# Patient Record
Sex: MALE | Race: BLACK OR AFRICAN AMERICAN
[De-identification: names, ages, dates, MRNs, and addresses within clinical notes are randomized per-mention and may not be internally consistent; named-entity substitution may affect disease eponyms.]

---
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
## 2021-04-07 ENCOUNTER — HOSPITAL ENCOUNTER (EMERGENCY)
Dept: HOSPITAL 75 - ER | Age: 21
LOS: 1 days | Discharge: HOME | End: 2021-04-08
Payer: SELF-PAY

## 2021-04-07 VITALS — HEIGHT: 66.02 IN | BODY MASS INDEX: 24.09 KG/M2 | WEIGHT: 149.91 LBS

## 2021-04-07 DIAGNOSIS — L02.214: Primary | ICD-10-CM

## 2021-04-07 DIAGNOSIS — Z87.891: ICD-10-CM

## 2021-04-07 DIAGNOSIS — Z20.822: ICD-10-CM

## 2021-04-07 DIAGNOSIS — Z79.52: ICD-10-CM

## 2021-04-07 PROCEDURE — 87077 CULTURE AEROBIC IDENTIFY: CPT

## 2021-04-07 PROCEDURE — 87070 CULTURE OTHR SPECIMN AEROBIC: CPT

## 2021-04-07 PROCEDURE — 87205 SMEAR GRAM STAIN: CPT

## 2021-04-07 PROCEDURE — 87635 SARS-COV-2 COVID-19 AMP PRB: CPT

## 2021-04-07 PROCEDURE — 99283 EMERGENCY DEPT VISIT LOW MDM: CPT

## 2021-04-08 VITALS — DIASTOLIC BLOOD PRESSURE: 74 MMHG | SYSTOLIC BLOOD PRESSURE: 122 MMHG

## 2021-04-08 NOTE — ED INTEGUMENTARY GENERAL
General


Chief Complaint:  Skin/Wound Problems


Stated Complaint:  CYST IN GROIN AREA


Nursing Triage Note:  


AMBULATES TO ROOM #5 ACCOMPANIED BY S/O WOULD C/O ABSCESS TO R GROIN X2-3 DAYS. 


REPORTS TO HAVE TAKEN 10MG FLEXARIL PRIOR TO ARRIVAL.


Source:  patient


Exam Limitations:  no limitations


 (HARRIS,HALLIE,MED STUDENT)





History of Present Illness


Date Seen by Provider:  Apr 8, 2021


Time Seen by Provider:  12:26


Initial Comments


Pt is a 19yo male with no PMH who presents to the ED complaining of a cyst in 

his R groin. States it has been there for 2-3 days and is causing a 

throbbing/stretching pain that makes it difficult to walk. He states this is the

5th or 6th time it has happened in the past 1.5 years. He reports being seen in 

[place] for the same issue in July of 2020 but would not let them drain it 

there. They gave him steroids and antibiotics and it resolved until 2-3 days 

ago. He states it has opened up on its own in the past and he was able to 

express yellow foul smelling drainage from it. It has not opened up yet this 

time. He also reports a dry cough for about 2 days, and feels feverish off and 

on but has not taken his temperature at home. Also notes some nausea, diarrhea 

and pressure in the area of the cyst with urination.


Timing/Duration:  week, getting worse


Severity:  mild


Location:  extremities (right groin)


Associated Symptoms:  fever (HARRIS,AHLLIE,MED STUDENT)





Allergies and Home Medications


Allergies


Coded Allergies:  


     lemon (Verified  Allergy, Unknown, 4/8/21)





Home Medications


Amoxicillin 500 Mg Capsule, 500 MG PO TID


   Prescribed by: ALEAH GORDON on 1/13/17 1353


Prednisone 5 Mg Tablet, 40 MG PO DAILY


   Prescribed by: ALEAH GORDON on 1/13/17 1353


Sulfamethoxazole/Trimethoprim 1 Each Tablet, 1 EACH PO BID


   Prescribed by: JOVANNA INGRAM on 4/8/21 0224





Patient Home Medication List


Home Medication List Reviewed:  Yes


 (JOVANNA YOUNG MD)





Review of Systems


Review of Systems


Constitutional:  chills, fever


EENTM:  no symptoms reported


Respiratory:  cough ("dry cough"); No short of breath


Cardiovascular:  No chest pain, No edema


Gastrointestinal:  No abdominal pain; diarrhea


Genitourinary:  No dysuria, No hematuria; other ("pressure with urination")


Musculoskeletal:  muscle pain (R groin area)


Skin:  lumps (R groin)


Psychiatric/Neurological:  No Symptoms Reported


Endocrine:  No Symptoms Reported


Hematologic/Lymphatic:  No Symptoms Reported (HARRIS,HALLIE,MED STUDENT)





Past Medical-Social-Family Hx


Patient Social History


Alcohol Use:  Denies Use


Smoking Status:  Former Smoker


Type Used:  Cigarettes


2nd Hand Smoke Exposure:  No


Recent Infectious Disease Expo:  No


Recent Hopitalizations:  No


 (HARRIS,HALLIE,MED STUDENT)





Seasonal Allergies


Seasonal Allergies:  No


 (HARRIS,HALLIE,MED STUDENT)





Past Medical History


Surgeries:  No


Respiratory:  No


Cardiac:  No


Neurological:  No


Reproductive Disorders:  No


Gastrointestinal:  No


Musculoskeletal:  No


Endocrine:  No


Cancer:  No


Psychosocial:  No


Integumentary:  No


Blood Disorders:  No


 (HARRIS,HALLIE,MED STUDENT)





Physical Exam


Vital Signs





Vital Signs - First Documented








 4/8/21 4/8/21





 00:08 02:44


 


Temp 36.6 


 


Pulse 92 


 


Resp 18 


 


B/P (MAP) 133/78 (96) 


 


Pulse Ox 97 


 


O2 Delivery  Room Air





 (JOVANNA YOUNG MD)


Vital Signs


Capillary Refill : Less Than 3 Seconds 


 (HARRIS,HALLIE,MED STUDENT)


General Appearance:  WD/WN, no apparent distress


HEENT:  PERRL/EOMI


Neck:  full range of motion, supple


Cardiovascular:  regular rate, rhythm, no murmur


Respiratory:  lungs clear, no respiratory distress, no accessory muscle use


Gastrointestinal:  normal bowel sounds, non tender, soft


Extremities:  normal range of motion, no pedal edema, no calf tenderness, 

swelling (R groin with firm, swollen area)


Skin Problem Location:  other (R groin)


Skin Problem Character:  erythema, swelling, tenderness, warm


 (HARRIS,HALLIE,MED STUDENT)





Procedures/Interventions


I&D :  


   Blade Size:  11


Progress


Skin was prepped with chlorhexidine wipes.  Approximately 1 mL of lidocaine with

epinephrine was injected into the surface skin over the abscess.  A 1 cm 

incision was made over the most fluctuant region of the abscess.  A large amount

of foul-smelling purulent material was expressed from the incision.


 (JOVANNA YOUNG MD)





Progress/Results/Core Measures


Results/Orders


Lab Results





Laboratory Tests








Test


 4/8/21


00:30 Range/Units


 


 


Coronavirus 2019 ([facility]) Negative  Negative  





 (JOVANNA YOUNG MD)


My Orders





Orders - JOVANNA YOUNG MD


Covid 19 Inhouse Test (4/8/21 00:28)


Hydrocodone/Apap 5/325 Tablet (Lortab 5 (4/8/21 00:30)


Ibuprofen  Tablet (Motrin  Tablet) (4/8/21 02:30)


Sulfamethoxazole/Trimet Ds Tab (Bactrim (4/8/21 02:30)


Wound Culture (4/8/21 02:21)


Hydrocodone/Apap 5/325 Tablet (Lortab 5 (4/8/21 02:45)


 (JOVANNA YOUNG MD)


Medications Given in ED





Current Medications








 Medications  Dose


 Ordered  Sig/Broderick


 Route  Start Time


 Stop Time Status Last Admin


Dose Admin


 


 Acetaminophen/


 Hydrocodone Bitart  1 ea  ONCE  ONCE


 PO  4/8/21 00:30


 4/8/21 00:32 DC 4/8/21 00:33


1 EA


 


 Acetaminophen/


 Hydrocodone Bitart  1 ea  ONCE  ONCE


 PO  4/8/21 02:45


 4/8/21 02:44 DC 4/8/21 02:43


1 EA


 


 Ibuprofen  600 mg  ONCE  ONCE


 PO  4/8/21 02:30


 4/8/21 02:31 DC 4/8/21 02:28


600 MG


 


 Trimethoprim/


 Sulfamethoxazole  1 ea  ONCE  ONCE


 PO  4/8/21 02:30


 4/8/21 02:31 DC 4/8/21 02:28


1 EA





 (JOVANNA YOUNG MD)


Vital Signs/I&O











 4/8/21 4/8/21





 00:08 02:44


 


Temp 36.6 36.6


 


Pulse 92 79


 


Resp 18 17


 


B/P (MAP) 133/78 (96) 122/74 (96)


 


Pulse Ox 97 98


 


O2 Delivery  Room Air





 (JOVANNA YOUNG MD)








Blood Pressure Mean:                    96











Progress


Progress Note :  


Progress Note


Bedside ultrasound was performed which demonstrated a significant amount of 

fluid collection to the abscess.  Patient consented to incision and drainage.  

He was first tested for Covid since he had dry cough.  Covid screen was 

negative.  He was also pretreated with hydrocodone and again treated with 

hydrocodone and ibuprofen after the procedure.  Culture was obtained.  Treatment

with Bactrim DS was also initiated.


 (JOVANNA YOUNG MD)





Departure


Impression





   Primary Impression:  


   Groin abscess


   Additional Impression:  


   Encounter for incision and drainage procedure


Disposition:  01 HOME, SELF-CARE


Condition:  Improved





Departure-Patient Inst.


Referrals:  


NO,LOCAL PHYSICIAN (PCP/Family)


Primary Care Physician


Patient Instructions:  Abscess Incision and Drainage (DC)





Add. Discharge Instructions:  


When you return home immediately shower and run warm water over the affected 

area for at least 10 minutes since you do not have a bathtub to soak in.  

Periodically gently scrub the area with the antibacterial soap while in the 

shower.  Repeat this 3 or 4 times a day for the next couple of days to encourage

the abscess to continue draining.  You may also very gently express drainage 

from the abscess if there is more accumulation.  Return to care if you have 

worsening of symptoms or develop new symptoms such as fever.  Complete your 

antibiotic as prescribed.  For pain you may take ibuprofen up to 600 mg every 6 

hours.  Add Tylenol (acetaminophen) up to 1000 mg every 6 hours for additional 

pain relief.  Use gauze pads to cover the area while it is draining.





All discharge instructions reviewed with patient and/or family. Voiced 

understanding.


Scripts


Sulfamethoxazole/Trimethoprim (Bactrim Ds Tablet) 1 Each Tablet


1 EACH PO BID, #10 TAB


   Prov: JOVANNA YOUNG MD         4/8/21





Medical Student Attestation and Attending Note:





I have personally interviewed and examined this patient along with Hallie Harris, MS 4. I have reviewed student documentation including history, 

physical, and assessments.  I agree with the documentation except where 

otherwise noted.








Exam:


General: Alert, oriented, no acute distress, well developed


HEENT: Normocephalic and atraumatic


Heart: Regular rate and rhythm without murmur


Lungs: Clear to auscultation bilaterally with normal effort


Neuropsych: Alert, oriented, no focal deficits


Skin: Warm and dry without rashes.  Abscess on the right medial thigh beneath 

scrotum that is tender to palpation and fluctuant to touch.


 (JOVANNA YOUNG MD)











HALLIE HARRIS,MED STUDENT     Apr 8, 2021 00:36


JOVANNA YOUNG MD         Apr 8, 2021 02:29

## 2021-10-25 ENCOUNTER — HOSPITAL ENCOUNTER (EMERGENCY)
Dept: HOSPITAL 75 - ER | Age: 21
Discharge: HOME | End: 2021-10-25
Payer: SELF-PAY

## 2021-10-25 VITALS — BODY MASS INDEX: 24.38 KG/M2 | WEIGHT: 149.91 LBS | HEIGHT: 65.75 IN

## 2021-10-25 VITALS — DIASTOLIC BLOOD PRESSURE: 70 MMHG | SYSTOLIC BLOOD PRESSURE: 115 MMHG

## 2021-10-25 DIAGNOSIS — K04.7: Primary | ICD-10-CM

## 2021-10-25 DIAGNOSIS — F17.200: ICD-10-CM

## 2021-10-25 DIAGNOSIS — Z79.52: ICD-10-CM

## 2021-10-25 PROCEDURE — 99283 EMERGENCY DEPT VISIT LOW MDM: CPT

## 2021-10-25 NOTE — ED EENT
History of Present Illness


General


Chief Complaint:  Dental Problems/Pain


Stated Complaint:  DENTAL PAIN


Nursing Triage Note:  


PT ARRIVES TO ER WITH C/O DENTAL PAIN FOR THE PAST FEW DAYS. PT SAID IT IS 


PAINFUL ON THE RIGHT TOP AND LOWER LEFT WITH THE LOWER LEFT HURTING WORSE AT 


10/10 PAIN





History of Present Illness


Date Seen by Provider:  Oct 25, 2021


Time Seen by Provider:  20:50


Initial Comments


21-year-old male presents for dental pain on the right upper and left lower 

molars.  He has tried Excedrin and Tylenol with no improvement in his symptoms. 

He reports last seeing a dentist approximately 8 months ago.  He did schedule an

appointment but cannot be seen at the [facility] dental clinic until November 15.  He 

denies difficulty eating or drinking.


Timing/Duration:  last week


Severity:  moderate


Prearrival Treatment:  over the counter meds


Associated Symptoms:  denies symptoms





Allergies and Home Medications


Allergies


Coded Allergies:  


     lemon (Verified  Allergy, Unknown, 4/8/21)





Patient Home Medication List


Home Medication List Reviewed:  Yes


Amoxicillin (Amoxicillin) 500 Mg Capsule, 500 MG PO TID


   Prescribed by: ALEAH GORDON on 1/13/17 1353


Amoxicillin (Amoxicillin) 500 Mg Capsule, 500 MG PO TID


   Prescribed by: MARV NARAYAN on 10/25/21 2104


Prednisone (Prednisone) 5 Mg Tablet, 40 MG PO DAILY


   Prescribed by: ALEAH GORDON on 1/13/17 1353


Sulfamethoxazole/Trimethoprim (Bactrim Ds Tablet) 1 Each Tablet, 1 EACH PO BID


   Prescribed by: JOVANNA INGRAM on 4/8/21 0224





Review of Systems


Review of Systems


Constitutional:  no symptoms reported, see HPI


Mouth:  see HPI; denies loose teeth; pain, swelling; denies purulent discharge


Throat:  no symptoms reported, see HPI


Respiratory:  no symptoms reported, see HPI





All Other Systems Reviewed


Negative Unless Noted:  Yes





Past Medical-Social-Family Hx


Patient Social History


Tobacco Use?:  No


Use of E-Cig and/or Vaping dev:  Yes


E-Cig or Vaping type used:  Nicotine


Use of E-Cig and/or Vaping Mega:  Current Everyday User


Substance use?:  No


Alcohol Use?:  No


Pt feels they are or have been:  No





Immunizations Up To Date


Influenza Vaccine Up-to-Date:  No; Not Current


First/Initial COVID19 Vaccinat:  SEPT 2021


Second COVID19 Vaccination Justin:  OCT 2021


COVID19 Vaccine :  MODERNA





Seasonal Allergies


Seasonal Allergies:  No





Past Medical History


Surgeries:  No


Respiratory:  No


Cardiac:  No


Neurological:  No


Reproductive Disorders:  No


Gastrointestinal:  No


Musculoskeletal:  No


Endocrine:  No


Cancer:  No


Psychosocial:  No


Integumentary:  No


Blood Disorders:  No





Family Medical History


Reviewed Nursing Family Hx





Physical Exam


Vital Signs





Vital Signs - First Documented








 10/25/21





 20:43


 


Temp 36.9


 


Pulse 70


 


Resp 18


 


B/P (MAP) 115/70 (85)


 


Pulse Ox 100


 


O2 Delivery Room Air








Height, Weight, BMI


Height: 5'6"


Weight: 135lbs. oz. 61.006511zx; 24.00 BMI


Method:Stated


General Appearance:  WD/WN, no apparent distress


Ears:  bilateral ear auricle normal, bilateral ear canal normal, bilateral ear 

TM normal


Nose:  normal inspection; No active bleeding, No discharge


Mouth/Throat:  pharynx normal, dental tenderness, mandibular swelling; No maxil

laure swelling, No pharynx tenderness, No tonsillar exudate


Neck:  full range of motion, supple, normal inspection, lymphadenopathy (R), 

lymphadenopathy (L)


Cardiovascular:  normal peripheral pulses, regular rate, rhythm


Respiratory:  chest non-tender, lungs clear, normal breath sounds


Neurologic/Psychiatric:  no motor/sensory deficits, alert, normal mood/affect, 

oriented x 3


Skin:  normal color, warm/dry





Progress/Results/Core Measures


Results/Orders


My Orders





Orders - MARV NARAYAN


Rx-Amoxicillin Capsule (Rx-Polymox Capsu (10/25/21 20:59)


Ibuprofen  Tablet (Motrin  Tablet) (10/25/21 20:59)





Vital Signs/I&O











 10/25/21 10/25/21





 20:43 21:33


 


Temp 36.9 36.9


 


Pulse 70 70


 


Resp 18 18


 


B/P (MAP) 115/70 (85) 115/70


 


Pulse Ox 100 100


 


O2 Delivery Room Air Room Air














Blood Pressure Mean:                    85











Departure


Impression





   Primary Impression:  


   Dental abscess


   Additional Impression:  


   Pain, dental


Disposition:  01 HOME, SELF-CARE


Condition:  Improved





Departure-Patient Inst.


Decision time for Depature:  20:55


Referrals:  


[facility] [place],LOCAL PHYSICIAN (PCP)


Primary Care Physician


Patient Instructions:  Dental Pain (DC), Tooth Abscess (DC)





Add. Discharge Instructions:  


Take ibuprofen 600 mg alternating with Tylenol 650 mg every 4 hours for pain or 

swelling.


Use warm water and salt to gargle and rinse in your mouth every 2 hours.


You can use over-the-counter dental pain ointments. 


Keep your scheduled appointment with the dentist, you can call to see about 

earlier appointments as available.


Follow-up at [facility] if your symptoms are not improving or worsen.


Take antibiotics as prescribed.


Return to the emergency department for new, urgent healthcare needs.





All discharge instructions reviewed with patient and/or family. Voiced 

understanding.


Scripts


Amoxicillin (Amoxicillin) 500 Mg Capsule


500 MG PO TID, #21 CAP 0 Refills


   Prov: MARV NARAYAN         10/25/21











MARV NARAYAN                  Oct 25, 2021 21:04

## 2021-11-12 ENCOUNTER — HOSPITAL ENCOUNTER (EMERGENCY)
Dept: HOSPITAL 75 - ER | Age: 21
Discharge: HOME | End: 2021-11-12
Payer: SELF-PAY

## 2021-11-12 VITALS — WEIGHT: 145.51 LBS | BODY MASS INDEX: 23.38 KG/M2 | HEIGHT: 66.02 IN

## 2021-11-12 VITALS — SYSTOLIC BLOOD PRESSURE: 144 MMHG | DIASTOLIC BLOOD PRESSURE: 93 MMHG

## 2021-11-12 DIAGNOSIS — K04.7: Primary | ICD-10-CM

## 2021-11-12 DIAGNOSIS — F17.210: ICD-10-CM

## 2021-11-12 DIAGNOSIS — Z79.52: ICD-10-CM

## 2021-11-12 PROCEDURE — 99283 EMERGENCY DEPT VISIT LOW MDM: CPT

## 2021-11-12 NOTE — ED EENT
History of Present Illness


General


Chief Complaint:  Dental Problems/Pain


Stated Complaint:  DENTAL PAIN/FEVER


Nursing Triage Note:  


PT AMB TO FT 3 W REPORTS OF DENTAL ABCESSES ON THE TOP RIGHT AND BOTTOM LEFT 


SIDE OF MOUTH. PT REPORTS BOTH TEETH ARE GETTING PULLED ON THURSDAY BUT THE PAIN




IS UNBEARABLE TODAY. PT CURRENTLY ON ANTIBIOTICS UNTIL SX. A&OX4.


Source:  patient


Exam Limitations:  no limitations


 (ALEAH GORDON)





History of Present Illness


Date Seen by Provider:  Nov 12, 2021


Time Seen by Provider:  17:22


Initial Comments


To ER with reports of dental abscess to the upper right and lower left.  He has 

a scheduled dental extraction next week with [facility].  He is currently 

on clindamycin for 3 days so far but is here tonight for worsening pain.


Timing/Duration:  this morning


Severity:  moderate


Associated Symptoms:  tooth pain (ALEAH GORDON)





Allergies and Home Medications


Allergies


Coded Allergies:  


     lemon (Verified  Allergy, Unknown, 4/8/21)





Patient Home Medication List


Home Medication List Reviewed:  Yes


 (ALEAH GORDON)


Amoxicillin (Amoxicillin) 500 Mg Capsule, 500 MG PO TID


   Prescribed by: ALEAH GORDON on 1/13/17 1353


Amoxicillin (Amoxicillin) 500 Mg Capsule, 500 MG PO TID


   Prescribed by: MARV NARAYAN on 10/25/21 2104


Hydrocodone/Acetaminophen (Hydrocodone-Acetamin 5-325 mg) 1 Each Tablet, 1 TAB 

PO Q4H PRN for PAIN-MODERATE (5-7)


   Prescribed by: ALEAH GORDON on 11/12/21 1727


Naproxen (Naprosyn) 500 Mg Tablet, 500 MG PO BID PRN for PAIN-MODERATE (5-7)


   Prescribed by: ALEAH GORDON on 11/12/21 1726


Prednisone (Prednisone) 5 Mg Tablet, 40 MG PO DAILY


   Prescribed by: ALEAH GORDON on 1/13/17 1353


Sulfamethoxazole/Trimethoprim (Bactrim Ds Tablet) 1 Each Tablet, 1 EACH PO BID


   Prescribed by: JOVANNA INGRAM on 4/8/21 0224





Review of Systems


Review of Systems


Constitutional:  see HPI


Eyes:  No Symptoms Reported


Ears:  No Symptoms Reported


Nose:  no symptoms reported


Mouth:  see HPI, pain, swelling


Throat:  no symptoms reported


Respiratory:  no symptoms reported


Cardiovascular:  no symptoms reported


Musculoskeletal:  no symptoms reported


Skin:  no symptoms reported


Neurological:  No Symptoms Reported


Hematologic/Lymphatic:  No Symptoms Reported


Immunological/Allergic:  no symptoms reported (ALEAH GORDON)





Past Medical-Social-Family Hx


Patient Social History


Tobacco Use?:  Yes


Tobacco type used:  Cigarettes


Smoking Status:  Current Everyday Smoker


Use of E-Cig and/or Vaping dev:  No


Substance use?:  No


Alcohol Use?:  No


 (ALEAH GORDON)





Immunizations Up To Date


First/Initial COVID19 Vaccinat:  SEPT 2021


Second COVID19 Vaccination Justin:  OCT 2021


COVID19 Vaccine :  MODERNA


 (ALEAH GORDON)





Seasonal Allergies


Seasonal Allergies:  No


 (ALEAH GORDON)





Past Medical History


Surgeries:  No


Respiratory:  No


Cardiac:  No


Neurological:  No


Reproductive Disorders:  No


Gastrointestinal:  No


Musculoskeletal:  No


Endocrine:  No


Cancer:  No


Psychosocial:  No


Integumentary:  No


Blood Disorders:  No


 (ALEAH GORDON)





Physical Exam


Vital Signs





Vital Signs - First Documented








 11/12/21





 16:49


 


Temp 36.8


 


Pulse 98


 


Resp 20


 


B/P (MAP) 150/100 (117)


 


Pulse Ox 98


 


O2 Delivery Room Air





 (JOVANNA YOUNG MD)


Height, Weight, BMI


Height: 5'6"


Weight: 135lbs. oz. 61.225835gd; 23.00 BMI


Method:Stated


General Appearance:  WD/WN


Eyes:  bilateral eye normal inspection, bilateral eye PERRL, bilateral eye EOMI


Ears:  bilateral ear auricle normal, bilateral ear canal normal, bilateral ear 

TM normal


Mouth/Throat:  No mandibular swelling, No maxillary swelling


Neck:  non-tender, full range of motion; No lymphadenopathy (R), No 

lymphadenopathy (L)


Cardiovascular:  regular rate, rhythm, no murmur


Respiratory:  normal breath sounds, no respiratory distress, no accessory muscle

use


Gastrointestinal:  normal bowel sounds, non tender, soft


Neurologic/Psychiatric:  alert, normal mood/affect, oriented x 3


Skin:  normal color, warm/dry (ALEAH GORDON)





Progress/Results/Core Measures


Results/Orders


Medications Given in ED





Current Medications








 Medications  Dose


 Ordered  Sig/Broderick


 Route  Start Time


 Stop Time Status Last Admin


Dose Admin


 


 Acetaminophen/


 Hydrocodone Bitart  1 ea  ONCE  ONCE


 PO  11/12/21 17:30


 11/12/21 17:31 DC 11/12/21 17:43


1 EA


 


 Ibuprofen  800 mg  ONCE  ONCE


 PO  11/12/21 17:30


 11/12/21 17:31 DC 11/12/21 17:43


800 MG





 (JOVANNA YOUNG MD)


Vital Signs/I&O











 11/12/21 11/12/21





 16:49 17:45


 


Temp 36.8 


 


Pulse 98 80


 


Resp 20 20


 


B/P (MAP) 150/100 (117) 144/93


 


Pulse Ox 98 100


 


O2 Delivery Room Air Room Air





 (JOVANNA YOUNG MD)








Blood Pressure Mean:                    117











Departure


Impression





   Primary Impression:  


   Dental abscess


Disposition:  01 HOME, SELF-CARE


Condition:  Stable





Departure-Patient Inst.


Decision time for Depature:  17:24


 (ALEAH GORDON)


Referrals:  


NO,LOCAL PHYSICIAN (PCP/Family)


Primary Care Physician


Patient Instructions:  Dental Pain ED





Add. Discharge Instructions:  


1.  Return to ER for any concerns


2.  Pain medication as directed.  Follow-up with your dentist as scheduled.





All discharge instructions reviewed with patient and/or family. Voiced 

understanding.


Scripts


Hydrocodone/Acetaminophen (Hydrocodone-Acetamin 5-325 mg) 1 Each Tablet


1 TAB PO Q4H PRN for PAIN-MODERATE (5-7), #14 TAB


   Prov: ALEAH GORDON         11/12/21 


Naproxen (Naprosyn) 500 Mg Tablet


500 MG PO BID PRN for PAIN-MODERATE (5-7), #30 TAB 0 Refills


   Prov: ALEAH GORDON         11/12/21








ATTENDING PHYSICIAN NOTE:


I was physically present as attending physician in the emergency department 

during the care of this patient, but I was not directly involved in the decision

making or delivery of care for this patient.


 (JOVANNA YOUNG MD)











ALEAH GORDON             Nov 12, 2021 17:28


JOVANNA YOUNG MD        Nov 12, 2021 19:03

## 2023-04-20 ENCOUNTER — HOSPITAL ENCOUNTER (EMERGENCY)
Dept: HOSPITAL 75 - ER | Age: 23
Discharge: HOME | End: 2023-04-20
Payer: SELF-PAY

## 2023-04-20 VITALS — WEIGHT: 142.2 LBS | HEIGHT: 66.02 IN | BODY MASS INDEX: 22.85 KG/M2

## 2023-04-20 VITALS — SYSTOLIC BLOOD PRESSURE: 124 MMHG | DIASTOLIC BLOOD PRESSURE: 76 MMHG

## 2023-04-20 DIAGNOSIS — N43.3: ICD-10-CM

## 2023-04-20 DIAGNOSIS — N45.1: Primary | ICD-10-CM

## 2023-04-20 LAB
APTT PPP: YELLOW S
BACTERIA #/AREA URNS HPF: (no result) /HPF
BILIRUB UR QL STRIP: NEGATIVE
FIBRINOGEN PPP-MCNC: (no result) MG/DL
GLUCOSE UR STRIP-MCNC: NEGATIVE MG/DL
KETONES UR QL STRIP: NEGATIVE
LEUKOCYTE ESTERASE UR QL STRIP: (no result)
NITRITE UR QL STRIP: NEGATIVE
PH UR STRIP: 7 [PH] (ref 5–9)
PROT UR QL STRIP: (no result)
SP GR UR STRIP: 1.02 (ref 1.02–1.02)
SQUAMOUS #/AREA URNS HPF: (no result) /HPF
WBC #/AREA URNS HPF: (no result) /HPF

## 2023-04-20 PROCEDURE — 87088 URINE BACTERIA CULTURE: CPT

## 2023-04-20 PROCEDURE — 87591 N.GONORRHOEAE DNA AMP PROB: CPT

## 2023-04-20 PROCEDURE — 76870 US EXAM SCROTUM: CPT

## 2023-04-20 PROCEDURE — 81000 URINALYSIS NONAUTO W/SCOPE: CPT

## 2023-04-20 PROCEDURE — 87491 CHLMYD TRACH DNA AMP PROBE: CPT

## 2023-04-20 PROCEDURE — 36415 COLL VENOUS BLD VENIPUNCTURE: CPT

## 2023-04-20 NOTE — ED GU-MALE
General


Chief Complaint:  General Problems/Pain


Stated Complaint:  TESTICLE PAIN


Nursing Triage Note:  


PT AMBULATE TO TRIAGE WITH C/O LEFT TESTICLE PAIN X2 DAYS. PT SENT FROM [facility]. PT 


REPORTS HE WAS PLAYING BASKETBALL X2 DAYS AGO AND LEFT TESTICLE STARTED HURTING 


THE NEXT DAY.


Source:  patient


Exam Limitations:  no limitations





History of Present Illness


Date Seen by Provider:  2023


Time Seen by Provider:  18:21


Initial Comments


22-year-old male presents to the ED from the [facility] walk-in clinic with complaints 

of left testicle pain which started yesterday at 4 PM.  Denies any injury to the

area.  Reports he feels as though his left testicle is swollen.  Denies any 

difficulty urinating, but does report pain when straining when he is towards the

end of the stream.  Denies any penile discharge.  Denies any concerns for STDs. 

Reports feeling hot and cold.  Denies chest pain, shortness of air, abdominal 

pain, nausea, vomiting.  Denies any past medical history, does not take any 

medications.  He was treated for chlamydia in 2022.





Allergies and Home Medications


Allergies


Coded Allergies:  


     lemon (Verified  Allergy, Unknown, 21)





Patient Home Medication List


Home Medication List Reviewed:  Yes


Amoxicillin (Amoxicillin) 500 Mg Capsule, 500 MG PO TID


   Prescribed by: ALEAH GORDON on 17 1353


Amoxicillin (Amoxicillin) 500 Mg Capsule, 500 MG PO TID


   Prescribed by: MARV NARAYAN on 10/25/21 2104


Doxycycline Hyclate (Doxycycline Hyclate) 100 Mg Capsule, 100 MG PO BID


   Prescribed by: Karly Saleem on 23


Hydrocodone/Acetaminophen (Hydrocodone-Acetamin 5-325 mg) 1 Each Tablet, 1 TAB 

PO Q4H PRN for PAIN-MODERATE (5-7)


   Prescribed by: ALEAH GORDON on 21 172


Naproxen (Naprosyn) 500 Mg Tablet, 500 MG PO BID PRN for PAIN-MODERATE (5-7)


   Prescribed by: ALEAH GORDON on 21 172


Prednisone (Prednisone) 5 Mg Tablet, 40 MG PO DAILY


   Prescribed by: ALEAH GORDON on 17 1353


Sulfamethoxazole/Trimethoprim (Bactrim Ds Tablet) 1 Each Tablet, 1 EACH PO BID


   Prescribed by: JOVANNA INGRAM on 4/8/21 0224





Review of Systems


Review of Systems


Constitutional:  see HPI





Past Medical-Social-Family Hx


Patient Social History


Tobacco Use?:  No


Smoking Status:  Never a Smoker


Smokeless Tobacco Frequency:  Never a User


Use of E-Cig and/or Vaping dev:  No


Use of E-Cig and/or Vaping Mega:  Never a User


Substance use?:  No


Alcohol Use?:  No


Pt feels they are or have been:  No





Immunizations Up To Date


First/Initial COVID19 Vaccinat:  2021


Second COVID19 Vaccination Justin:  OCT 2021





Seasonal Allergies


Seasonal Allergies:  No





Past Medical History


Surgeries:  No


Respiratory:  No


Cardiac:  No


Neurological:  No


Reproductive Disorders:  No


Gastrointestinal:  No


Musculoskeletal:  No


Endocrine:  No


Cancer:  No


Psychosocial:  No


Integumentary:  No


Blood Disorders:  No





Physical Exam


Vital Signs





Vital Signs - First Documented








 23





 17:58 20:30


 


Temp 36.6 


 


Pulse 90 


 


Resp 19 


 


B/P (MAP) 121/72 (88) 


 


Pulse Ox  98


 


O2 Delivery Room Air 





Capillary Refill : Less Than 3 Seconds


Height, Weight, BMI


Height: 5'6"


Weight: 135lbs. oz. 61.291402ni; 22.00 BMI


Method:Stated


General Appearance:  WD/WN, mild distress


Neck:  supple, normal inspection


Cardiovascular:  regular rate, rhythm, no edema, no gallop, no JVD, no murmur


Respiratory:  lungs clear, normal breath sounds, no respiratory distress, no 

accessory muscle use


Male:  testicular tenderness (Left), other (Left testicle feels more firm than 

right testicle, left testicle is not larger in size than right testicle)


Extremities:  normal range of motion, normal inspection


Neurologic/Psychiatric:  alert, normal mood/affect


Skin:  normal color, warm/dry





Progress/Results/Core Measures


Suspected Sepsis


SIRS


Temperature: 


Pulse: 90 


Respiratory Rate: 19


 


Blood Pressure 121 /72 


Mean: 88





Results/Orders


Lab Results





Laboratory Tests








Test


 23


18:35 Range/Units


 


 


Urine Color YELLOW   


 


Urine Clarity CLOUDY   


 


Urine pH 7.0  5-9  


 


Urine Specific Gravity 1.025 H 1.016-1.022  


 


Urine Protein TRACE H NEGATIVE  


 


Urine Glucose (UA) NEGATIVE  NEGATIVE  


 


Urine Ketones NEGATIVE  NEGATIVE  


 


Urine Nitrite NEGATIVE  NEGATIVE  


 


Urine Bilirubin NEGATIVE  NEGATIVE  


 


Urine Urobilinogen 1.0  < = 1.0  MG/DL


 


Urine Leukocyte Esterase 3+ H NEGATIVE  


 


Urine RBC (Auto) 1+ H NEGATIVE  


 


Urine RBC 10-25 H  /HPF


 


Urine WBC 5-10 H  /HPF


 


Urine Squamous Epithelial


Cells NONE 


  /HPF





 


Urine Crystals NONE   /LPF


 


Urine Bacteria TRACE   /HPF


 


Urine Casts NONE   /LPF


 


Urine Mucus MODERATE H  /LPF


 


Urine Culture Indicated YES   








Micro Results





Microbiology


23 Urine Culture - Final, Complete


          NO GROWTH





My Orders





Orders - KARLY SALEEM APRN


Hydrocodone/Apap 5/325 Tablet (Lortab 5 (23 18:30)


Us Scrotum (Testicle) [id number] (23 18:27)


Ua Culture If Indicated (23 18:34)


Chlamydia Trachomatis Urine (23 18:34)


Neis Lenin Dna Urine Test (23 18:34)


Urine Culture (23 18:35)


Ceftriaxone Inj (Rocephin Inj) (23 20:00)


Lidocaine 1% Inj 20 Ml (Xylocaine 1% Inj (23 20:00)


Doxycycline Hyclate Tablet (Vibramycin T (23 20:00)





Medications Given in ED





Vital Signs/I&O











 23





 17:58 18:31 20:30


 


Temp 36.6 36.6 


 


Pulse 90  78


 


Resp 19  18


 


B/P (MAP) 121/72 (88)  124/76


 


Pulse Ox   98


 


O2 Delivery Room Air  Room Air





Capillary Refill : Less Than 3 Seconds








Blood Pressure Mean:                    88








Progress Note :  


   Time:  18:48


Progress Note


Patient seen and evaluated, resting in bed, mild distress.  Based on exam and 

symptoms, concerned for testicular torsion.  UA, gonorrhea urine, chlamydia 

urine, scrotal ultrasound, and pain medication ordered.





 Ultrasound reviewed. Ultrasound shows bilateral epididymitis with small 

hydroceles. UA reviewed. UA shows 3+ leukocytes, 1+ RBCs, 5-10 WBCs, moderate 

mucus, trace bacteria. This is likely due to gonorrhea or chlamydia. Will treat 

patient for STI and epididymitis. Results discussed with patient. Discharge 

instructions and return precautions provided.





Diagnostic Imaging





   Diagonstic Imaging:  Ultrasound


   Plain Films/CT/US/NM/MRI:  other (Scrotum)


Comments


                 [facility]


                                [place], [place]





NAME:   JACQUELINE GARCIA


MED REC#:   [id number]


ACCOUNT#:   [id number]


PT STATUS:   REG ER


:   2000


PHYSICIAN:   KARLY SALEEM


ADMIT DATE:   23/ER


                                  ***Signed***


Date of Exam:23





US SCROTUM (Testicle) [id number]








PROCEDURE: US Scrotum.





TECHNIQUE: Multiple real-time grayscale images were obtained over


the scrotum in various projections bilaterally.





INDICATION: Scrotal pain and swelling





The right testicle measures 3.8 x 2.3 x 2.8 cm. Left testicle


measures 3.9 x 2.0 x 3.0 cm. The testes have normal echogenicity


and blood flow. The right epididymis is hyperemic. The left


epididymis is also hyperemic. There are small bilateral


hydroceles. There is no varicocele.





IMPRESSION: Bilateral epididymitis with small hydroceles.





Dictated by: 





  Dictated on workstation # [id number]








Dict:   23


Trans:   23


[facility] [id number]





Interpreted by:     JOSEPH WOO MD


Electronically signed by: JOSEPH WOO MD 23





Departure


Impression





   Primary Impression:  


   Acute epididymitis


Disposition:  01 HOME, SELF-CARE


Condition:  Stable





Departure-Patient Inst.


Decision time for Depature:  20:02


Referrals:  


[facility] (PCP/Family)


Primary Care Physician


Patient Instructions:  Epididymitis (DC)





Add. Discharge Instructions:  


Complete full course of antibiotic, even if you begin to feel better.


If your symptoms are not improving after 7 days of the antibiotic, you need to 

see your primary care provider.


All your partners need to be tested and treated since this may be caused by a 

sexually transmitted infection.


You may take Tylenol or ibuprofen over-the-counter as needed for pain.


Return for severe pain, difficulty urinating, or any other new, concerning, or 

worsening symptoms.





All discharge instructions reviewed with patient and/or family. Voiced 

understanding.


Scripts


Doxycycline Hyclate (Doxycycline Hyclate) 100 Mg Capsule


100 MG PO BID for 10 Days, #20 CAP 0 Refills


   Prov: KARLY SALEEM         23











KARLY SALEEM        2023 18:36

## 2023-04-20 NOTE — DIAGNOSTIC IMAGING REPORT
PROCEDURE: US Scrotum.



TECHNIQUE: Multiple real-time grayscale images were obtained over

the scrotum in various projections bilaterally.



INDICATION: Scrotal pain and swelling



The right testicle measures 3.8 x 2.3 x 2.8 cm. Left testicle

measures 3.9 x 2.0 x 3.0 cm. The testes have normal echogenicity

and blood flow. The right epididymis is hyperemic. The left

epididymis is also hyperemic. There are small bilateral

hydroceles. There is no varicocele.



IMPRESSION: Bilateral epididymitis with small hydroceles.



Dictated by: 



  Dictated on workstation # [id number]